# Patient Record
Sex: FEMALE | Race: WHITE | NOT HISPANIC OR LATINO | Employment: UNEMPLOYED | URBAN - METROPOLITAN AREA
[De-identification: names, ages, dates, MRNs, and addresses within clinical notes are randomized per-mention and may not be internally consistent; named-entity substitution may affect disease eponyms.]

---

## 2023-02-08 ENCOUNTER — HOSPITAL ENCOUNTER (OUTPATIENT)
Dept: RADIOLOGY | Age: 68
Discharge: HOME | End: 2023-02-08

## 2023-02-08 DIAGNOSIS — R10.84 GENERALIZED ABDOMINAL PAIN: ICD-10-CM

## 2023-02-08 RX ADMIN — IOHEXOL 100 ML: 350 INJECTION, SOLUTION INTRAVENOUS at 12:41

## 2023-02-10 ENCOUNTER — TELEPHONE (OUTPATIENT)
Dept: GASTROENTEROLOGY | Facility: CLINIC | Age: 68
End: 2023-02-10

## 2023-02-10 ENCOUNTER — PREP FOR PROCEDURE (OUTPATIENT)
Dept: GASTROENTEROLOGY | Facility: AMBULARY SURGERY CENTER | Age: 68
End: 2023-02-10

## 2023-02-10 ENCOUNTER — TELEPHONE (OUTPATIENT)
Dept: GASTROENTEROLOGY | Facility: AMBULARY SURGERY CENTER | Age: 68
End: 2023-02-10

## 2023-02-10 DIAGNOSIS — Z12.11 SCREENING FOR COLON CANCER: Primary | ICD-10-CM

## 2023-02-10 NOTE — TELEPHONE ENCOUNTER
[facility] Assessment    Name: Gary Burden  YOB: 1955  Last Height: 5' 2" (1 575 m)  Last weight: 56 2 kg (132lb)  BMI: 22 68 kg/m²  Procedure: Colonoscopy  Diagnosis: screening   Date of procedure: 04-  Prep: ?  Responsible : yes Emili Cabrera  Phone#: [phone]   Name completing form: [facility]  Date form completed: 02/10/23      If the patient answers yes to any of these questions, schedule in a hospital  Are you pregnant: No  Do you rely on a wheelchair for mobility: No  Have you been diagnosed with End Stage Renal Disease (ESRD): No  Do you need oxygen during the day: No  Have you had a heart attack or stroke within the past three months: No  Have you had a seizure within the past three months: No  Have you ever been informed by anesthesia that you have a difficult airway: No  Additional Questions  Have you had any cardiac testing or are under the care of a Cardiologist (see cardiac list): No  Cardiac list:   Do you have an implanted cardiac defibrillator: No (Comment:  This patient should be scheduled in the hospital)    Have any bleeding problems, such as anemia or hemophilia (If patient has H&H result below 8, schedule in hospital   H&H must be within 30 days of procedure): No    Had an organ transplant within the past 3 months: No    Do you have any present infections: No  Do you get short of breath when walking a few blocks: No  Have you been diagnosed with diabetes: No  Comments (provide cardiac provider information if applicable):

## 2023-02-10 NOTE — TELEPHONE ENCOUNTER
Scheduled date of colonoscopy (as of today):04-  Physician performing colonoscopy:Xavi   Location of colonoscopy: [facility]   Bowel prep reviewed with patient:? Instructions reviewed with patient by:?   Clearances: N/A

## 2023-02-10 NOTE — TELEPHONE ENCOUNTER
02/10/23  Screened by: Katharine Powell    Referring Provider     Pre- Screening: There is no height or weight on file to calculate BMI  Has patient been referred for a routine screening Colonoscopy? yes  Is the patient between 39-70 years old? yes      Previous Colonoscopy no   If yes:    Date:     Facility:     Reason:       SCHEDULING STAFF: If the patient is between 45yrs-49yrs, please advise patient to confirm benefits/coverage with their insurance company for a routine screening colonoscopy, some insurance carriers will only cover at [address] or older  If the patient is over 66years old, please schedule an office visit  Does the patient want to see a Gastroenterologist prior to their procedure OR are they having any GI symptoms? no    Has the patient been hospitalized or had abdominal surgery in the past 6 months? no    Does the patient use supplemental oxygen? no    Does the patient take Coumadin, Lovenox, Plavix, Elliquis, Xarelto, or other blood thinning medication? no    Has the patient had a stroke, cardiac event, or stent placed in the past year? no    SCHEDULING STAFF: If patient answers NO to above questions, then schedule procedure  If patient answers YES to above questions, then schedule office appointment  If patient is between 45yrs - 49yrs, please advise patient that we will have to confirm benefits & coverage with their insurance company for a routine screening colonoscopy        PASSED

## 2023-02-10 NOTE — TELEPHONE ENCOUNTER
pts  returned call to schedule colonoscopy  After speaking with him he stated that pt would like to have a consultation first as she is having rectal bleeding and constipation   Scheduled pt for an OV with Lenard Avina and added to the wait list

## 2023-02-13 NOTE — TELEPHONE ENCOUNTER
Spoke to pt whom wanted procedure date moved up  Procedure date moved up and appt cancelled as she did not feel needed  Reviewed instructions and emailed to her

## 2023-02-16 ENCOUNTER — APPOINTMENT (OUTPATIENT)
Dept: LAB | Facility: MEDICAL CENTER | Age: 68
End: 2023-02-16

## 2023-02-16 ENCOUNTER — OFFICE VISIT (OUTPATIENT)
Dept: GASTROENTEROLOGY | Facility: MEDICAL CENTER | Age: 68
End: 2023-02-16

## 2023-02-16 VITALS
SYSTOLIC BLOOD PRESSURE: 112 MMHG | TEMPERATURE: 98.4 F | BODY MASS INDEX: 25.06 KG/M2 | DIASTOLIC BLOOD PRESSURE: 80 MMHG | WEIGHT: 137 LBS | HEART RATE: 71 BPM

## 2023-02-16 DIAGNOSIS — R10.30 LOWER ABDOMINAL PAIN: ICD-10-CM

## 2023-02-16 DIAGNOSIS — K62.5 RECTAL BLEEDING: ICD-10-CM

## 2023-02-16 DIAGNOSIS — R19.4 CHANGE IN BOWEL HABITS: ICD-10-CM

## 2023-02-16 DIAGNOSIS — R19.4 CHANGE IN BOWEL HABITS: Primary | ICD-10-CM

## 2023-02-16 PROBLEM — F41.9 ANXIETY DISORDER: Status: ACTIVE | Noted: 2018-10-06

## 2023-02-16 LAB
ALBUMIN SERPL BCP-MCNC: 3.8 G/DL (ref 3.5–5)
ALP SERPL-CCNC: 62 U/L (ref 46–116)
ALT SERPL W P-5'-P-CCNC: 42 U/L (ref 12–78)
ANION GAP SERPL CALCULATED.3IONS-SCNC: 2 MMOL/L (ref 4–13)
AST SERPL W P-5'-P-CCNC: 27 U/L (ref 5–45)
BILIRUB SERPL-MCNC: 0.48 MG/DL (ref 0.2–1)
BUN SERPL-MCNC: 17 MG/DL (ref 5–25)
CALCIUM SERPL-MCNC: 10.1 MG/DL (ref 8.3–10.1)
CHLORIDE SERPL-SCNC: 107 MMOL/L (ref 96–108)
CO2 SERPL-SCNC: 30 MMOL/L (ref 21–32)
CREAT SERPL-MCNC: 0.78 MG/DL (ref 0.6–1.3)
ERYTHROCYTE [DISTWIDTH] IN BLOOD BY AUTOMATED COUNT: 13.1 % (ref 11.6–15.1)
FERRITIN SERPL-MCNC: 86 NG/ML (ref 8–388)
GFR SERPL CREATININE-BSD FRML MDRD: 80 ML/MIN/1.73SQ M
GLUCOSE SERPL-MCNC: 80 MG/DL (ref 65–140)
HCT VFR BLD AUTO: 40.6 % (ref 34.8–46.1)
HGB BLD-MCNC: 13.2 G/DL (ref 11.5–15.4)
MCH RBC QN AUTO: 30.6 PG (ref 26.8–34.3)
MCHC RBC AUTO-ENTMCNC: 32.5 G/DL (ref 31.4–37.4)
MCV RBC AUTO: 94 FL (ref 82–98)
PLATELET # BLD AUTO: 290 THOUSANDS/UL (ref 149–390)
PMV BLD AUTO: 9.6 FL (ref 8.9–12.7)
POTASSIUM SERPL-SCNC: 4.2 MMOL/L (ref 3.5–5.3)
PROT SERPL-MCNC: 7.6 G/DL (ref 6.4–8.4)
RBC # BLD AUTO: 4.31 MILLION/UL (ref 3.81–5.12)
SODIUM SERPL-SCNC: 139 MMOL/L (ref 135–147)
TSH SERPL DL<=0.05 MIU/L-ACNC: 2.81 UIU/ML (ref 0.45–4.5)
WBC # BLD AUTO: 7.21 THOUSAND/UL (ref 4.31–10.16)

## 2023-02-16 NOTE — PROGRESS NOTES
[facility] - Outpatient Consultation  Haley Ibrahim 74 y o  female MRN: [id number]  Encounter: [id number]          ASSESSMENT AND PLAN:    71-year-old female with anxiety and asthma here to discuss change in bowel habits for the last 2 months  Also describes self-limited episodes of lower abdominal pain with passage of mucus and blood which were most recently associated with a UTI  She is already scheduled for a colonoscopy on 3/2  Will check labs to assess for iron deficiency anemia, hypothyroidism, electrolyte abnormalities  Her CT does demonstrate significant stool burden so will try bowel cleanse and then resume laxative regimen  1  Change in bowel habits  2  Rectal bleeding  3  Lower abdominal pain  - TSH, 3rd generation; Future  - CBC and Platelet; Future  - Ferritin; Future  - Comprehensive metabolic panel; Future  -Colonoscopy is already scheduled  Will use GoLytely based bowel prep given her propensity to constipation  - polyethylene glycol (GOLYTELY) 4000 mL solution; Take 4,000 mL by mouth once for 1 dose  Dispense: 4000 mL; Refill: 0  -Home bowel cleanse with 238 g of MiraLAX mixed in 64 ounces of Gatorade  After completing home bowel cleanse, continue daily MiraLAX and adjust as needed    ______________________________________________________________________    HPI:    Has been constipated for the last 2 months  Used to have good response to magnesium   Tried Miralax and mineral oil which didn't work  Sennakot initially helped and then stopped  Feels like something is stuck  Did Fleet enema but only had skinny stools  Had CT scan 2/8/23 with small liver hemangioma and otherwise normal    First week of January, had lower abdominal pain and fecal urgency but would only pass mucous and blood  Was diagnosed with UTI and treated with antibiotics and it went away  Had episode of "colitis" several years ago that was similar  No prior colonoscopy   Had rectal prolapse during pregnancy  Had hemorrhoidectomy at 45  Sister has colitis with bowel bleeds  No UC, Crohns or colon cancer  REVIEW OF SYSTEMS:    CONSTITUTIONAL: Denies any fever, chills, rigors, and weight loss  HEENT: No earache or tinnitus  Denies hearing loss or visual disturbances  CARDIOVASCULAR: No chest pain or palpitations  RESPIRATORY: Denies any cough, hemoptysis, shortness of breath or dyspnea on exertion  GASTROINTESTINAL: As noted in the History of Present Illness  GENITOURINARY: No problems with urination  Denies any hematuria or dysuria  NEUROLOGIC: No dizziness or vertigo, denies headaches  MUSCULOSKELETAL: Denies any muscle or joint pain  SKIN: Denies skin rashes or itching  ENDOCRINE: Denies excessive thirst  Denies intolerance to heat or cold  PSYCHOSOCIAL: Denies depression or anxiety  Denies any recent memory loss         Historical Information   Past Medical History:   Diagnosis Date   • Asthma    • Ear problems    • Nasal congestion    • Psychiatric disorder     depression   • Tinnitus      Past Surgical History:   Procedure Laterality Date   • HEMORROIDECTOMY       Social History   Social History     Substance and Sexual Activity   Alcohol Use Yes     Social History     Substance and Sexual Activity   Drug Use No     Social History     Tobacco Use   Smoking Status Former   Smokeless Tobacco Never     Family History   Problem Relation Age of Onset   • No Known Problems Mother    • No Known Problems Father        Meds/Allergies       Current Outpatient Medications:   •  albuterol (PROVENTIL HFA,VENTOLIN HFA) 90 mcg/act inhaler  •  albuterol 2 mg/5 mL syrup  •  escitalopram (LEXAPRO) 10 mg tablet  •  famotidine (PEPCID) 20 mg tablet  •  FLUoxetine (PROzac) 40 MG capsule  •  fluticasone (FLONASE) 50 mcg/act nasal spray  •  predniSONE 20 mg tablet    No Known Allergies        Objective     Blood pressure 112/80, pulse 71, temperature 98 4 °F (36 9 °C), weight 62 1 kg (137 lb)  Body mass index is 25 06 kg/m²  PHYSICAL EXAM:      General Appearance:   Alert, cooperative, no distress   HEENT:   Normocephalic, atraumatic, anicteric  Neck:  Supple, symmetrical, trachea midline   Lungs:   Clear to auscultation bilaterally; no rales, rhonchi or wheezing; respirations unlabored    Heart[de-identified]   Regular rate and rhythm; no murmur, rub, or gallop  Abdomen:   Soft, mild right-sided abdominal tenderness, non-distended; normal bowel sounds; no masses, no organomegaly    Genitalia:   Deferred    Rectal:   Deferred    Extremities:  No cyanosis, clubbing or edema    Pulses:  2+ and symmetric    Skin:  No jaundice, rashes, or lesions    Lymph nodes:  No palpable cervical lymphadenopathy        Lab Results:   No visits with results within 1 Day(s) from this visit  Latest known visit with results is:   No results found for any previous visit  Radiology Results:   CT abdomen pelvis w contrast    Result Date: 2/9/2023  Narrative: CT ABDOMEN AND PELVIS WITH IV CONTRAST INDICATION:   R10 84: Generalized abdominal pain  COMPARISON:  None  TECHNIQUE:  CT examination of the abdomen and pelvis was performed  Axial, sagittal, and coronal 2D reformatted images were created from the source data and submitted for interpretation  Radiation dose length product (DLP) for this visit:  696 mGy-cm   This examination, like all CT scans performed in the [facility], was performed utilizing techniques to minimize radiation dose exposure, including the use of iterative reconstruction and automated exposure control  IV Contrast:  100 mL of iohexol (OMNIPAQUE) Enteric Contrast:  Enteric contrast was not administered   FINDINGS: ABDOMEN LOWER CHEST:  No clinically significant abnormality identified in the visualized lower chest  LIVER/BILIARY TREE:  Subcentimeter focus of arterial hyperenhancement in the right hepatic lobe (series 2 image 43), which may represent a flash filling hemangioma or perfusional changes  GALLBLADDER:  No calcified gallstones  No pericholecystic inflammatory change  SPLEEN:  Unremarkable  PANCREAS:  Unremarkable  ADRENAL GLANDS:  Unremarkable  KIDNEYS/URETERS:  Left mid renal simple cyst  No hydronephrosis  STOMACH AND BOWEL:  No diverticula  Otherwise, unremarkable  APPENDIX:  No findings to suggest appendicitis  ABDOMINOPELVIC CAVITY:  No ascites  No pneumoperitoneum  No lymphadenopathy  VESSELS:  Atherosclerotic changes are present  No evidence of aneurysm  PELVIS REPRODUCTIVE ORGANS:  Unremarkable for patient's age  URINARY BLADDER:  Unremarkable  ABDOMINAL WALL/INGUINAL REGIONS:  Unremarkable  OSSEOUS STRUCTURES:  No acute fracture or destructive osseous lesion  Spinal degenerative changes are noted  Impression: No acute abnormality in the abdomen or pelvis  Subcentimeter focus of arterial hyperenhancement in the right hepatic lobe, which may represent a flash filling hemangioma or perfusional changes  No suspicious liver lesions   Workstation performed: [id number]

## 2023-02-16 NOTE — PATIENT INSTRUCTIONS
Bowel clean out to reset colon:    When you are starting the clean out:  Take two (2) 5 mg Dulcolax laxative tablets  Mix entire container of Miralax with one (1) 64-ounce bottle of Gatorade and shake until all medication is dissolved  Begin drinking solution  Drink an eight (8) ounce cup every 10-15 minutes until you have consumed half (32 ounces) of the solution  Refrigerate remaining solution  3-5 hours after finishing the first half:  Drink the remaining amount of prepared solution (32 ounces)  Drink an eight (8) ounce cup every 10-15 minutes until you have consumed the remaining solution

## 2023-02-21 ENCOUNTER — TELEPHONE (OUTPATIENT)
Dept: OTHER | Facility: OTHER | Age: 68
End: 2023-02-21

## 2023-02-21 NOTE — TELEPHONE ENCOUNTER
Her low anion gap is very mild and this usually is not of clinical significance  Most times  Low anion is due to lab error  She can certainly discuss with her PCP though  Thanks!

## 2023-02-21 NOTE — TELEPHONE ENCOUNTER
Spoke with pt  Relayed information regarding anion gap per [employer]   Pt verbalized understanding, all questions answered to her satisfaction

## 2023-02-21 NOTE — TELEPHONE ENCOUNTER
Patient would like a call back to discuss low Anion GAP results, she wants to know what does it means

## 2023-03-02 ENCOUNTER — ANESTHESIA (OUTPATIENT)
Dept: GASTROENTEROLOGY | Facility: AMBULATORY SURGERY CENTER | Age: 68
End: 2023-03-02

## 2023-03-02 ENCOUNTER — HOSPITAL ENCOUNTER (OUTPATIENT)
Dept: GASTROENTEROLOGY | Facility: AMBULATORY SURGERY CENTER | Age: 68
Discharge: HOME | End: 2023-03-02

## 2023-03-02 ENCOUNTER — ANESTHESIA EVENT (OUTPATIENT)
Dept: GASTROENTEROLOGY | Facility: AMBULATORY SURGERY CENTER | Age: 68
End: 2023-03-02

## 2023-03-02 VITALS
DIASTOLIC BLOOD PRESSURE: 62 MMHG | HEIGHT: 62 IN | BODY MASS INDEX: 24.11 KG/M2 | SYSTOLIC BLOOD PRESSURE: 104 MMHG | OXYGEN SATURATION: 96 % | TEMPERATURE: 96.8 F | WEIGHT: 131 LBS | HEART RATE: 64 BPM | RESPIRATION RATE: 18 BRPM

## 2023-03-02 DIAGNOSIS — Z12.11 SCREENING FOR COLON CANCER: ICD-10-CM

## 2023-03-02 RX ORDER — PROPOFOL 10 MG/ML
INJECTION, EMULSION INTRAVENOUS AS NEEDED
Status: DISCONTINUED | OUTPATIENT
Start: 2023-03-02 | End: 2023-03-02

## 2023-03-02 RX ORDER — SODIUM CHLORIDE, SODIUM LACTATE, POTASSIUM CHLORIDE, CALCIUM CHLORIDE 600; 310; 30; 20 MG/100ML; MG/100ML; MG/100ML; MG/100ML
20 INJECTION, SOLUTION INTRAVENOUS CONTINUOUS
Status: DISCONTINUED | OUTPATIENT
Start: 2023-03-02 | End: 2023-03-06 | Stop reason: HOSPADM

## 2023-03-02 RX ORDER — SODIUM CHLORIDE 9 MG/ML
20 INJECTION, SOLUTION INTRAVENOUS CONTINUOUS
Status: DISCONTINUED | OUTPATIENT
Start: 2023-03-02 | End: 2023-03-06 | Stop reason: HOSPADM

## 2023-03-02 RX ORDER — SODIUM CHLORIDE, SODIUM LACTATE, POTASSIUM CHLORIDE, CALCIUM CHLORIDE 600; 310; 30; 20 MG/100ML; MG/100ML; MG/100ML; MG/100ML
INJECTION, SOLUTION INTRAVENOUS CONTINUOUS PRN
Status: DISCONTINUED | OUTPATIENT
Start: 2023-03-02 | End: 2023-03-02

## 2023-03-02 RX ADMIN — SODIUM CHLORIDE, SODIUM LACTATE, POTASSIUM CHLORIDE, CALCIUM CHLORIDE: 600; 310; 30; 20 INJECTION, SOLUTION INTRAVENOUS at 10:25

## 2023-03-02 RX ADMIN — PROPOFOL 100 MG: 10 INJECTION, EMULSION INTRAVENOUS at 10:36

## 2023-03-02 RX ADMIN — SODIUM CHLORIDE, SODIUM LACTATE, POTASSIUM CHLORIDE, CALCIUM CHLORIDE: 600; 310; 30; 20 INJECTION, SOLUTION INTRAVENOUS at 10:52

## 2023-03-02 RX ADMIN — PROPOFOL 50 MG: 10 INJECTION, EMULSION INTRAVENOUS at 10:42

## 2023-03-02 RX ADMIN — PROPOFOL 50 MG: 10 INJECTION, EMULSION INTRAVENOUS at 10:52

## 2023-03-02 NOTE — ANESTHESIA POSTPROCEDURE EVALUATION
Post-Op Assessment Note    CV Status:  Stable  Pain Score: 0    Pain management: adequate     Mental Status:  Alert and awake   Hydration Status:  Euvolemic   PONV Controlled:  Controlled   Airway Patency:  Patent      Post Op Vitals Reviewed: Yes      Staff: CRNA         No notable events documented      BP   100/57   Temp  98   Pulse  66   Resp   16   SpO2   99

## 2023-03-02 NOTE — ANESTHESIA PREPROCEDURE EVALUATION
Procedure:  COLONOSCOPY    Relevant Problems   NEURO/PSYCH   (+) Anxiety disorder      PULMONARY   (+) Asthma      Depression   Arthritis     Patient states asthma is well controlled  Will use inhaler at night before going to sleep sometimes, however no recent issues  Physical Exam    Airway    Mallampati score: II  TM Distance: <3 FB  Neck ROM: full     Dental   No notable dental hx     Cardiovascular      Pulmonary      Other Findings        Anesthesia Plan  ASA Score- 2     Anesthesia Type- IV sedation with anesthesia with ASA Monitors  Additional Monitors:   Airway Plan:           Plan Factors-Exercise tolerance (METS): >4 METS  Chart reviewed  Patient summary reviewed  Patient is not a current smoker  Obstructive sleep apnea risk education given perioperatively  Induction- intravenous  Postoperative Plan-     Informed Consent- Anesthetic plan and risks discussed with patient  I personally reviewed this patient with the CRNA  Discussed and agreed on the Anesthesia Plan with the CRNA  Frutoso Spatz

## 2023-03-02 NOTE — H&P
History and Physical - [facility]  Ganga Guzman 74 y o  female MRN: [id number]                  HPI: Ganga Guzman is a 74y o  year old female who presents for colorectal cancer screening      REVIEW OF SYSTEMS: Per the HPI, and otherwise unremarkable  Historical Information   Past Medical History:   Diagnosis Date   • Anxiety    • Asthma    • Constipation    • DDD (degenerative disc disease), cervical    • Ear problems    • Irregular heart beat     15 years ago   • Nasal congestion    • Prolapse of intestine     after child birth   • Psychiatric disorder     depression   • Tinnitus      Past Surgical History:   Procedure Laterality Date   • EYE SURGERY      as a child   • HEMORROIDECTOMY       Social History   Social History     Substance and Sexual Activity   Alcohol Use Yes    Comment: social     Social History     Substance and Sexual Activity   Drug Use No     Social History     Tobacco Use   Smoking Status Former   Smokeless Tobacco Never     Family History   Problem Relation Age of Onset   • No Known Problems Mother    • No Known Problems Father        Meds/Allergies       Current Outpatient Medications:   •  albuterol (PROVENTIL HFA,VENTOLIN HFA) 90 mcg/act inhaler  •  escitalopram (LEXAPRO) 10 mg tablet  •  famotidine (PEPCID) 20 mg tablet  •  fluticasone (FLONASE) 50 mcg/act nasal spray  No current facility-administered medications for this encounter      Facility-Administered Medications Ordered in Other Encounters:   •  lactated ringers infusion, , Intravenous, Continuous PRN, New Bag at 03/02/23 1025    No Known Allergies    Objective     /66   Pulse 72   Temp (!) 96 8 °F (36 °C) (Temporal)   Resp 18   Ht 5' 2" (1 575 m)   Wt 59 4 kg (131 lb)   LMP  (LMP Unknown)   SpO2 99%   BMI 23 96 kg/m²       PHYSICAL EXAM    Gen: NAD  Head: NCAT  CV: RRR  CHEST: Clear  ABD: soft, NT/ND  EXT: no edema      ASSESSMENT/PLAN:  This is a 74y o  year old female here for colonoscopy, and she is stable and optimized for her procedure

## 2024-02-15 ENCOUNTER — OFFICE VISIT (OUTPATIENT)
Dept: URGENT CARE | Facility: MEDICAL CENTER | Age: 69
End: 2024-02-15
Payer: MEDICARE

## 2024-02-15 ENCOUNTER — APPOINTMENT (EMERGENCY)
Dept: CT IMAGING | Facility: HOSPITAL | Age: 69
End: 2024-02-15
Payer: MEDICARE

## 2024-02-15 ENCOUNTER — HOSPITAL ENCOUNTER (EMERGENCY)
Facility: HOSPITAL | Age: 69
Discharge: HOME | End: 2024-02-15
Attending: EMERGENCY MEDICINE
Payer: MEDICARE

## 2024-02-15 VITALS
RESPIRATION RATE: 18 BRPM | OXYGEN SATURATION: 97 % | TEMPERATURE: 97.9 F | DIASTOLIC BLOOD PRESSURE: 72 MMHG | HEART RATE: 82 BPM | WEIGHT: 131 LBS | HEIGHT: 62 IN | BODY MASS INDEX: 24.11 KG/M2 | SYSTOLIC BLOOD PRESSURE: 147 MMHG

## 2024-02-15 VITALS
DIASTOLIC BLOOD PRESSURE: 78 MMHG | HEIGHT: 62 IN | OXYGEN SATURATION: 100 % | SYSTOLIC BLOOD PRESSURE: 135 MMHG | TEMPERATURE: 97.6 F | RESPIRATION RATE: 18 BRPM | HEART RATE: 74 BPM | WEIGHT: 132.72 LBS | BODY MASS INDEX: 24.42 KG/M2

## 2024-02-15 DIAGNOSIS — K52.89 STERCORAL COLITIS: Primary | ICD-10-CM

## 2024-02-15 DIAGNOSIS — R10.84 GENERALIZED ABDOMINAL PAIN: Primary | ICD-10-CM

## 2024-02-15 LAB
ALBUMIN SERPL BCP-MCNC: 4.3 G/DL (ref 3.5–5)
ALP SERPL-CCNC: 66 U/L (ref 34–104)
ALT SERPL W P-5'-P-CCNC: 28 U/L (ref 7–52)
ANION GAP SERPL CALCULATED.3IONS-SCNC: 7 MMOL/L
AST SERPL W P-5'-P-CCNC: 22 U/L (ref 13–39)
BACTERIA UR QL AUTO: ABNORMAL /HPF
BASOPHILS # BLD AUTO: 0.03 THOUSANDS/ÂΜL (ref 0–0.1)
BASOPHILS NFR BLD AUTO: 0 % (ref 0–1)
BILIRUB SERPL-MCNC: 0.45 MG/DL (ref 0.2–1)
BILIRUB UR QL STRIP: NEGATIVE
BUN SERPL-MCNC: 16 MG/DL (ref 5–25)
CALCIUM SERPL-MCNC: 9.6 MG/DL (ref 8.4–10.2)
CHLORIDE SERPL-SCNC: 106 MMOL/L (ref 96–108)
CLARITY UR: CLEAR
CO2 SERPL-SCNC: 27 MMOL/L (ref 21–32)
COLOR UR: COLORLESS
CREAT SERPL-MCNC: 0.68 MG/DL (ref 0.6–1.3)
EOSINOPHIL # BLD AUTO: 0.06 THOUSAND/ÂΜL (ref 0–0.61)
EOSINOPHIL NFR BLD AUTO: 0 % (ref 0–6)
ERYTHROCYTE [DISTWIDTH] IN BLOOD BY AUTOMATED COUNT: 12.9 % (ref 11.6–15.1)
GFR SERPL CREATININE-BSD FRML MDRD: 91 ML/MIN/1.73SQ M
GLUCOSE SERPL-MCNC: 100 MG/DL (ref 65–140)
GLUCOSE UR STRIP-MCNC: NEGATIVE MG/DL
HCT VFR BLD AUTO: 40.1 % (ref 34.8–46.1)
HGB BLD-MCNC: 13.4 G/DL (ref 11.5–15.4)
HGB UR QL STRIP.AUTO: ABNORMAL
IMM GRANULOCYTES # BLD AUTO: 0.05 THOUSAND/UL (ref 0–0.2)
IMM GRANULOCYTES NFR BLD AUTO: 0 % (ref 0–2)
KETONES UR STRIP-MCNC: NEGATIVE MG/DL
LACTATE SERPL-SCNC: 0.7 MMOL/L (ref 0.5–2)
LEUKOCYTE ESTERASE UR QL STRIP: NEGATIVE
LYMPHOCYTES # BLD AUTO: 1.71 THOUSANDS/ÂΜL (ref 0.6–4.47)
LYMPHOCYTES NFR BLD AUTO: 13 % (ref 14–44)
MCH RBC QN AUTO: 31.2 PG (ref 26.8–34.3)
MCHC RBC AUTO-ENTMCNC: 33.4 G/DL (ref 31.4–37.4)
MCV RBC AUTO: 94 FL (ref 82–98)
MONOCYTES # BLD AUTO: 0.61 THOUSAND/ÂΜL (ref 0.17–1.22)
MONOCYTES NFR BLD AUTO: 5 % (ref 4–12)
NEUTROPHILS # BLD AUTO: 10.97 THOUSANDS/ÂΜL (ref 1.85–7.62)
NEUTS SEG NFR BLD AUTO: 82 % (ref 43–75)
NITRITE UR QL STRIP: NEGATIVE
NON-SQ EPI CELLS URNS QL MICRO: ABNORMAL /HPF
NRBC BLD AUTO-RTO: 0 /100 WBCS
PH UR STRIP.AUTO: 6.5 [PH]
PLATELET # BLD AUTO: 312 THOUSANDS/UL (ref 149–390)
PMV BLD AUTO: 9 FL (ref 8.9–12.7)
POTASSIUM SERPL-SCNC: 4.3 MMOL/L (ref 3.5–5.3)
PROT SERPL-MCNC: 7.7 G/DL (ref 6.4–8.4)
PROT UR STRIP-MCNC: NEGATIVE MG/DL
RBC # BLD AUTO: 4.29 MILLION/UL (ref 3.81–5.12)
RBC #/AREA URNS AUTO: ABNORMAL /HPF
SODIUM SERPL-SCNC: 140 MMOL/L (ref 135–147)
SP GR UR STRIP.AUTO: 1.01 (ref 1–1.03)
UROBILINOGEN UR STRIP-ACNC: <2 MG/DL
WBC # BLD AUTO: 13.43 THOUSAND/UL (ref 4.31–10.16)
WBC #/AREA URNS AUTO: ABNORMAL /HPF

## 2024-02-15 PROCEDURE — G0463 HOSPITAL OUTPT CLINIC VISIT: HCPCS | Performed by: ORTHOPAEDIC SURGERY

## 2024-02-15 PROCEDURE — 81001 URINALYSIS AUTO W/SCOPE: CPT | Performed by: EMERGENCY MEDICINE

## 2024-02-15 PROCEDURE — 99284 EMERGENCY DEPT VISIT MOD MDM: CPT

## 2024-02-15 PROCEDURE — 74177 CT ABD & PELVIS W/CONTRAST: CPT

## 2024-02-15 PROCEDURE — 85025 COMPLETE CBC W/AUTO DIFF WBC: CPT | Performed by: EMERGENCY MEDICINE

## 2024-02-15 PROCEDURE — 80053 COMPREHEN METABOLIC PANEL: CPT | Performed by: EMERGENCY MEDICINE

## 2024-02-15 PROCEDURE — 99213 OFFICE O/P EST LOW 20 MIN: CPT | Performed by: ORTHOPAEDIC SURGERY

## 2024-02-15 PROCEDURE — 83605 ASSAY OF LACTIC ACID: CPT | Performed by: EMERGENCY MEDICINE

## 2024-02-15 PROCEDURE — G1004 CDSM NDSC: HCPCS

## 2024-02-15 PROCEDURE — 99285 EMERGENCY DEPT VISIT HI MDM: CPT | Performed by: EMERGENCY MEDICINE

## 2024-02-15 PROCEDURE — 36415 COLL VENOUS BLD VENIPUNCTURE: CPT | Performed by: EMERGENCY MEDICINE

## 2024-02-15 RX ORDER — MAGNESIUM CARB/ALUMINUM HYDROX 105-160MG
TABLET,CHEWABLE ORAL
Qty: 296 ML | Refills: 0 | Status: SHIPPED | OUTPATIENT
Start: 2024-02-15

## 2024-02-15 RX ADMIN — IOHEXOL 85 ML: 350 INJECTION, SOLUTION INTRAVENOUS at 14:48

## 2024-02-15 NOTE — DISCHARGE INSTRUCTIONS
Drink magnesium citrate as directed to help with constipation/associated colitis.  Repeat dose in 2 hours if you do not have significant stool passage.    Increase your daily intake of water and fiber to keep stools moving well after this.  Consider use of a fiber supplement, magnesium supplement and/or another agent such as docusate (stool softener) or MiraLAX as directed on over-the-counter packaging to prevent another episode of severe constipation.    Return to the emergency department as needed for any worsening or new concerns.

## 2024-02-15 NOTE — PROGRESS NOTES
"  [facility]        NAME: Alley Jain is a 68 y.o. female  : 1955    MRN: [id number]  DATE: February 15, 2024  TIME: 1:02 PM    Assessment and Plan   Generalized abdominal pain [R10.84]  1. Generalized abdominal pain  Transfer to other facility        Based on history and exam, I recommend the patient proceed to the ED for further evaluation. The patient verbalized understanding, noting that she felt comfortable driving to [facility], or having her  drive her.     Patient Instructions     Proceed directly to the ED.     Chief Complaint     Chief Complaint   Patient presents with    Constipation     Pt. Reports abdominal pan and constipation that began today. States she used three enemas with only one small result. She does not recall when her last normal bowel movement was.          History of Present Illness       68 YOF presents to the urgent care for evaluation of abdominal pain, constipation. She has a history of colitis, internal hemorrhoids, constipation. The patient has seen GI in the past and underwent a colonoscopy on 3/2/2023, which showed no significant abnormalities. The patient complains for the past couple of days she has had a feeling of pressure and the urge to poop, but has not been able to have a bowel movement. The patient states that her last normal bowel movement was 2 days ago. She does admit to blood in her stool, which she describes as bright red and mucus-y. She has had blood in her stool in the past, likely due to hemorrhoids. The patient denies any urinary symptoms, but did state, \"besides my chronic urgency\". She admits to nausea but denies any vomiting or diarrhea. She denies any fevers. For symptom relief she has tried fleet enemas and chamomile tea.         Review of Systems   Review of Systems   Constitutional:  Negative for chills and fever.   HENT:  Negative for ear pain and sore throat.    Eyes:  Negative for pain and visual disturbance. "   Respiratory:  Negative for cough and shortness of breath.    Cardiovascular:  Negative for chest pain and palpitations.   Gastrointestinal:  Positive for abdominal pain, blood in stool, constipation and nausea. Negative for diarrhea and vomiting.   Genitourinary:  Positive for urgency. Negative for dysuria and hematuria.   Musculoskeletal:  Negative for arthralgias and back pain.   Skin:  Negative for color change and rash.   Neurological:  Negative for dizziness, seizures, syncope and headaches.   Psychiatric/Behavioral: Negative.     All other systems reviewed and are negative.        Current Medications       Current Outpatient Medications:     albuterol (PROVENTIL HFA,VENTOLIN HFA) 90 mcg/act inhaler, Inhale 2 puffs every 6 (six) hours as needed for wheezing., Disp: , Rfl:     escitalopram (LEXAPRO) 10 mg tablet, ORAL ORAL TAKE ONE TABLET BY MOUTH DAILY, Disp: , Rfl:     famotidine (PEPCID) 20 mg tablet, TAKE 1 TABLET (20 MG TOTAL) BY MOUTH 2 (TWO) TIMES A DAY (Patient not taking: Reported on 2/15/2024), Disp: 60 tablet, Rfl: 5    fluticasone (FLONASE) 50 mcg/act nasal spray, 2 sprays into each nostril daily (Patient not taking: Reported on 2/15/2024), Disp: 16 g, Rfl: 5    Current Allergies     Allergies as of 02/15/2024    (No Known Allergies)            The following portions of the patient's history were reviewed and updated as appropriate: allergies, current medications, past family history, past medical history, past social history, past surgical history and problem list.     Past Medical History:   Diagnosis Date    Anxiety     Asthma     Constipation     DDD (degenerative disc disease), cervical     Ear problems     Irregular heart beat     15 years ago    Nasal congestion     Prolapse of intestine     after child birth    Psychiatric disorder     depression    Tinnitus        Past Surgical History:   Procedure Laterality Date    EYE SURGERY      as a child    HEMORROIDECTOMY         Family History  "  Problem Relation Age of Onset    No Known Problems Mother     No Known Problems Father          Medications have been verified.        Objective   /72   Pulse 82   Temp 97.9 °F (36.6 °C)   Resp 18   Ht 5' 2\" (1.575 m)   Wt 59.4 kg (131 lb)   LMP  (LMP Unknown)   SpO2 97%   BMI 23.96 kg/m²        Physical Exam     Physical Exam  Vitals and nursing note reviewed.   Constitutional:       General: She is not in acute distress.     Appearance: Normal appearance. She is not ill-appearing.   HENT:      Head: Normocephalic and atraumatic.      Right Ear: Tympanic membrane normal.      Left Ear: Tympanic membrane normal.      Nose: Nose normal.      Mouth/Throat:      Pharynx: Oropharynx is clear.   Eyes:      Extraocular Movements: Extraocular movements intact.      Pupils: Pupils are equal, round, and reactive to light.   Cardiovascular:      Rate and Rhythm: Normal rate and regular rhythm.      Pulses: Normal pulses.      Heart sounds: Normal heart sounds.   Pulmonary:      Effort: Pulmonary effort is normal. No respiratory distress.      Breath sounds: Normal breath sounds.   Abdominal:      General: Bowel sounds are normal. There is no distension.      Palpations: Abdomen is soft.      Tenderness: There is abdominal tenderness (generalized, though more so along the LLQ). There is no right CVA tenderness, left CVA tenderness, guarding or rebound.      Hernia: No hernia is present.   Musculoskeletal:         General: Normal range of motion.      Cervical back: Normal range of motion.   Skin:     General: Skin is warm and dry.      Capillary Refill: Capillary refill takes less than 2 seconds.   Neurological:      General: No focal deficit present.      Mental Status: She is alert and oriented to person, place, and time.   Psychiatric:         Mood and Affect: Mood normal.         Behavior: Behavior normal.                   "

## 2024-02-15 NOTE — ED PROVIDER NOTES
History  Chief Complaint   Patient presents with    Constipation     Patient thinks she has a bowel blockage states hasn't had a bowel movement in a few days. Patient having lower abd pain/pressure that started this morning      Patient is a 68-year-old female presents to the emergency department for evaluation with abdominal discomfort.  She gestures across the entire lower area and describes an intense pressure as well as a sharp pain with urge to defecate.  Onset today.  She has been able to pass only very tiny amounts of mucus alone +/- tiny amounts of blood.  She has not been able to pass any significant amount of flatus and despite using both glycerin and saline enemas expelled only a very tiny piece of stool.  She is uncertain when her last bowel movement was prior to today noting it may have been a few days.  She denies having had any difficulty passing stool at that time or having noticed any blood on other recent bowel movements.  Appetite is diminished today.  She has not yet had anything to eat.  She did have some mild nausea in the morning.  No fevers.  She does have chronic urinary frequency-unchanged.  No dysuria or hematuria.  She does have history of a couple of episodes of colitis of uncertain etiology.  Colonoscopy performed in spring of last year was unremarkable.  No diverticuli appreciated.  Current discomfort feels dissimilar to that with colitis when she had a much more severe pain.  She does have history of severe hemorrhoids for which she underwent surgical management remotely.  On occasion she appreciates small amounts of prolapse from the rectum which she needs to manually reduce.  This is not worse today.  No history of abdominal surgeries.  She she did have some nausea a couple of weekends ago at time when multiple family members had gastroenteritis.  As she was about to vomit on a few occasions she syncopized.  Did not ultimately experience vomiting or diarrhea.  No other recent  symptoms of illness such as respiratory or fever.        Prior to Admission Medications   Prescriptions Last Dose Informant Patient Reported? Taking?   albuterol (PROVENTIL HFA,VENTOLIN HFA) 90 mcg/act inhaler   Yes No   Sig: Inhale 2 puffs every 6 (six) hours as needed for wheezing.   escitalopram (LEXAPRO) 10 mg tablet   Yes No   Sig: ORAL ORAL TAKE ONE TABLET BY MOUTH DAILY   famotidine (PEPCID) 20 mg tablet   No No   Sig: TAKE 1 TABLET (20 MG TOTAL) BY MOUTH 2 (TWO) TIMES A DAY   Patient not taking: Reported on 2/15/2024   fluticasone (FLONASE) 50 mcg/act nasal spray   No No   Si sprays into each nostril daily   Patient not taking: Reported on 2/15/2024      Facility-Administered Medications: None       Past Medical History:   Diagnosis Date    Anxiety     Asthma     Constipation     DDD (degenerative disc disease), cervical     Ear problems     Irregular heart beat     15 years ago    Nasal congestion     Prolapse of intestine     after child birth    Psychiatric disorder     depression    Tinnitus        Past Surgical History:   Procedure Laterality Date    EYE SURGERY      as a child    HEMORROIDECTOMY         Family History   Problem Relation Age of Onset    No Known Problems Mother     No Known Problems Father      I have reviewed and agree with the history as documented.    E-Cigarette/Vaping    E-Cigarette Use Never User      E-Cigarette/Vaping Substances     Social History     Tobacco Use    Smoking status: Former    Smokeless tobacco: Never   Vaping Use    Vaping status: Never Used   Substance Use Topics    Alcohol use: Yes     Comment: social    Drug use: No       Review of Systems   All other systems reviewed and are negative.      Physical Exam  Physical Exam  Vitals and nursing note reviewed.   Constitutional:       General: She is in acute distress.      Comments: Uncomfortable appearing with hips flexed.   HENT:      Head: Normocephalic.      Mouth/Throat:      Mouth: Mucous membranes are  moist.   Eyes:      Extraocular Movements: Extraocular movements intact.      Conjunctiva/sclera: Conjunctivae normal.   Cardiovascular:      Rate and Rhythm: Normal rate and regular rhythm.      Heart sounds: Normal heart sounds.   Pulmonary:      Effort: Pulmonary effort is normal.      Breath sounds: Normal breath sounds.   Abdominal:      General: Bowel sounds are normal.      Palpations: Abdomen is soft.      Tenderness: There is abdominal tenderness (Across the entire lower abdomen/pelvis). There is right CVA tenderness (slight). There is no left CVA tenderness or guarding.   Genitourinary:     Comments: Small, uninflamed, nontender external hemorrhoids.  Normal rectal tone.  No stricture or mass appreciated.  No specific tenderness with this.  Unable to palpate significant stool.  Secretions Hemoccult positive  Musculoskeletal:         General: Normal range of motion.      Cervical back: Normal range of motion.   Skin:     General: Skin is warm and dry.   Neurological:      General: No focal deficit present.      Mental Status: She is alert and oriented to person, place, and time.   Psychiatric:         Mood and Affect: Mood normal.         Behavior: Behavior normal.         Vital Signs  ED Triage Vitals   Temperature Pulse Respirations Blood Pressure SpO2   02/15/24 1343 02/15/24 1342 02/15/24 1342 02/15/24 1342 02/15/24 1342   97.6 °F (36.4 °C) 78 16 137/69 99 %      Temp Source Heart Rate Source Patient Position - Orthostatic VS BP Location FiO2 (%)   02/15/24 1343 02/15/24 1342 02/15/24 1342 02/15/24 1342 --   Oral Monitor Lying Right arm       Pain Score       02/15/24 1342       4           Vitals:    02/15/24 1342 02/15/24 1510   BP: 137/69 135/78   Pulse: 78 74   Patient Position - Orthostatic VS: Lying          Visual Acuity      ED Medications  Medications   iohexol (OMNIPAQUE) 350 MG/ML injection (MULTI-DOSE) 100 mL (85 mL Intravenous Given 2/15/24 1448)       Diagnostic Studies  Results Reviewed        Procedure Component Value Units Date/Time    Urine Microscopic [[id number]]  (Abnormal) Collected: 02/15/24 1439    Lab Status: Final result Specimen: Urine, Other Updated: 02/15/24 1447     RBC, UA 2-4 /hpf      WBC, UA 1-2 /hpf      Epithelial Cells None Seen /hpf      Bacteria, UA None Seen /hpf     UA (URINE) with reflex to Scope [[id number]]  (Abnormal) Collected: 02/15/24 1439    Lab Status: Final result Specimen: Urine, Other Updated: 02/15/24 1446     Color, UA Colorless     Clarity, UA Clear     Specific Gravity, UA 1.007     pH, UA 6.5     Leukocytes, UA Negative     Nitrite, UA Negative     Protein, UA Negative mg/dl      Glucose, UA Negative mg/dl      Ketones, UA Negative mg/dl      Urobilinogen, UA <2.0 mg/dl      Bilirubin, UA Negative     Occult Blood, UA Small    Lactic acid, plasma (w/reflex if result > 2.0) [[id number]]  (Normal) Collected: 02/15/24 1358    Lab Status: Final result Specimen: Blood from Arm, Left Updated: 02/15/24 1425     LACTIC ACID 0.7 mmol/L     Narrative:      Result may be elevated if tourniquet was used during collection.    Comprehensive metabolic panel [[id number]] Collected: 02/15/24 1358    Lab Status: Final result Specimen: Blood from Arm, Left Updated: 02/15/24 1425     Sodium 140 mmol/L      Potassium 4.3 mmol/L      Chloride 106 mmol/L      CO2 27 mmol/L      ANION GAP 7 mmol/L      BUN 16 mg/dL      Creatinine 0.68 mg/dL      Glucose 100 mg/dL      Calcium 9.6 mg/dL      AST 22 U/L      ALT 28 U/L      Alkaline Phosphatase 66 U/L      Total Protein 7.7 g/dL      Albumin 4.3 g/dL      Total Bilirubin 0.45 mg/dL      eGFR 91 ml/min/1.73sq m     Narrative:      National Kid[employer] Foundation guidelines for Chronic Kidney Disease (CKD):     Stage 1 with normal or high GFR (GFR > 90 mL/min/1.73 square meters)    Stage 2 Mild CKD (GFR = 60-89 mL/min/1.73 square meters)    Stage 3A Moderate CKD (GFR = 45-59 mL/min/1.73 square meters)    Stage 3B Moderate CKD (GFR =  30-44 mL/min/1.73 square meters)    Stage 4 Severe CKD (GFR = 15-29 mL/min/1.73 square meters)    Stage 5 End Stage CKD (GFR <15 mL/min/1.73 square meters)  Note: GFR calculation is accurate only with a steady state creatinine    CBC and differential [[id number]]  (Abnormal) Collected: 02/15/24 1358    Lab Status: Final result Specimen: Blood from Arm, Left Updated: 02/15/24 1405     WBC 13.43 Thousand/uL      RBC 4.29 Million/uL      Hemoglobin 13.4 g/dL      Hematocrit 40.1 %      MCV 94 fL      MCH 31.2 pg      MCHC 33.4 g/dL      RDW 12.9 %      MPV 9.0 fL      Platelets 312 Thousands/uL      nRBC 0 /100 WBCs      Neutrophils Relative 82 %      Immat GRANS % 0 %      Lymphocytes Relative 13 %      Monocytes Relative 5 %      Eosinophils Relative 0 %      Basophils Relative 0 %      Neutrophils Absolute 10.97 Thousands/µL      Immature Grans Absolute 0.05 Thousand/uL      Lymphocytes Absolute 1.71 Thousands/µL      Monocytes Absolute 0.61 Thousand/µL      Eosinophils Absolute 0.06 Thousand/µL      Basophils Absolute 0.03 Thousands/µL                    CT abdomen pelvis with contrast   Final Result by Sebas Salinas MD (02/15 1553)      Moderately increased stool burden throughout the colon, suggesting constipation, with mild wall thickening and adjacent fat stranding involving the distal sigmoid colon and rectum, suggesting stercoral proctocolitis.         Workstation performed: [id number]                    Procedures  Procedures         ED Course  ED Course as of 02/16/24 0115   Thu Feb 15, 2024   1441 Differential diagnosis includes but is not limited to diverticulitis, severe constipation with stool higher than rectal vault, partial or full bowel obstruction, colitis, alternate intra-abdominal/pelvic mass applying pressure to colon, dysmotility, ileus.   1656 I discussed findings with patient regarding stercoral colitis and large amount of stool throughout the colon (with exception of very distal  portion/rectum).  Enemas attempted at home were ineffective due to large quantity of stool and high nature of its location.  Advised larger volume enema here.  Patient was quite hesitant and preferred treatment in her home.  We did discuss alternate option of taking magnesium citrate and she very much preferred this.  She additionally notes that she had been on a different magnesium supplement orally which had kept her stools moving well until recently.  She notes that she had become forgetful about taking it and skipped several days worth.  She notes that she does not tend to drink a large amount of water due to concerns with urinary frequency.  She is aware that this quantity should be increased along with good ingestion of fiber.    Risk of enema and/or consumption of magnesium citrate would include possibility of vagal reaction which she seemed to experience a couple of weeks ago with nausea.  She is aware to proceed with caution.  Provided strong return precautions as needed increased pain, absence of subsequent stool passage or any other concerns.                               SBIRT 22yo+      Flowsheet Row Most Recent Value   Initial Alcohol Screen: US AUDIT-C     1. How often do you have a drink containing alcohol? 0 Filed at: 02/15/2024 1344   2. How many drinks containing alcohol do you have on a typical day you are drinking?  0 Filed at: 02/15/2024 1344   3a. Male UNDER 65: How often do you have five or more drinks on one occasion? 0 Filed at: 02/15/2024 1344   3b. FEMALE Any Age, or MALE 65+: How often do you have 4 or more drinks on one occassion? 0 Filed at: 02/15/2024 1344   Audit-C Score 0 Filed at: 02/15/2024 1344   SPENSER: How many times in the past year have you...    Used an illegal drug or used a prescription medication for non-medical reasons? Never Filed at: 02/15/2024 1344                      Medical Decision Making  Amount and/or Complexity of Data Reviewed  Labs: ordered.  Radiology:  ordered.    Risk  OTC drugs.  Prescription drug management.             Disposition  Final diagnoses:   Stercoral colitis     Time reflects when diagnosis was documented in both MDM as applicable and the Disposition within this note       Time User Action Codes Description Comment    2/15/2024  4:31 PM Serina Logan Add [K52.89] Stercoral colitis           ED Disposition       ED Disposition   Discharge    Condition   Stable    Date/Time   Thu Feb 15, 2024 1630    Comment   Alley Jain discharge to home/self care.                   Follow-up Information       Follow up With Specialties Details Why Contact Info Additional Information    Srinivasa Brand MD Internal Medicine Schedule an appointment as soon as possible for a visit   [address] [facility] [address]  [phone]       [facility] Emergency Medicine Go to  As needed, If symptoms worsen [facility] [address]  [phone] [facility], [address] [place] [address], [id number]            Discharge Medication List as of 2/15/2024  4:44 PM        START taking these medications    Details   magnesium citrate (CITROMA) 1.745 g/30 mL oral solution Take 148 mL orally for constipation.  Repeat in 2 hours if you do not have significant stool passage., Print           CONTINUE these medications which have NOT CHANGED    Details   albuterol (PROVENTIL HFA,VENTOLIN HFA) 90 mcg/act inhaler Inhale 2 puffs every 6 (six) hours as needed for wheezing., Until Discontinued, Historical Med      escitalopram (LEXAPRO) 10 mg tablet ORAL ORAL TAKE ONE TABLET BY MOUTH DAILY, Historical Med      famotidine (PEPCID) 20 mg tablet TAKE 1 TABLET (20 MG TOTAL) BY MOUTH 2 (TWO) TIMES A DAY, Starting Mon 8/28/2023, Normal      fluticasone (FLONASE) 50 mcg/act nasal spray 2 sprays into each nostril daily, Starting Mon 6/13/2022, Normal              No discharge procedures on file.    PDMP Review       None            ED Provider  Electronically Signed by             Serina Logan MD  02/16/24 0114